# Patient Record
Sex: FEMALE
[De-identification: names, ages, dates, MRNs, and addresses within clinical notes are randomized per-mention and may not be internally consistent; named-entity substitution may affect disease eponyms.]

---

## 2024-06-11 ENCOUNTER — DASHBOARD ENCOUNTERS (OUTPATIENT)
Age: 59
End: 2024-06-11

## 2024-06-11 ENCOUNTER — OFFICE VISIT (OUTPATIENT)
Dept: URBAN - METROPOLITAN AREA CLINIC 48 | Facility: CLINIC | Age: 59
End: 2024-06-11
Payer: COMMERCIAL

## 2024-06-11 ENCOUNTER — LAB OUTSIDE AN ENCOUNTER (OUTPATIENT)
Dept: URBAN - METROPOLITAN AREA CLINIC 48 | Facility: CLINIC | Age: 59
End: 2024-06-11

## 2024-06-11 VITALS
HEIGHT: 64 IN | DIASTOLIC BLOOD PRESSURE: 83 MMHG | TEMPERATURE: 97.7 F | BODY MASS INDEX: 22.74 KG/M2 | SYSTOLIC BLOOD PRESSURE: 133 MMHG | HEART RATE: 58 BPM | WEIGHT: 133.2 LBS

## 2024-06-11 DIAGNOSIS — D50.8 ACHLORHYDRIC ANEMIA: ICD-10-CM

## 2024-06-11 DIAGNOSIS — Z12.11 SCREENING FOR COLON CANCER: ICD-10-CM

## 2024-06-11 PROBLEM — 87522002: Status: ACTIVE | Noted: 2024-06-11

## 2024-06-11 PROCEDURE — 99204 OFFICE O/P NEW MOD 45 MIN: CPT | Performed by: INTERNAL MEDICINE

## 2024-06-11 RX ORDER — SERTRALINE HYDROCHLORIDE 100 MG/1
1 TABLET TABLET, FILM COATED ORAL ONCE A DAY
Refills: 0 | Status: ACTIVE | COMMUNITY

## 2024-06-11 NOTE — HPI-TODAY'S VISIT:
59-year-old female with history of lymphoma (status post lymph node radiation in 2015) presents for evaluation of anemia.  Patient follows with Dr. Santana at [facility] in [place].  At oncology OV 5/6/2024, patient's hemoglobin was 11.7 with normal MCV, and iron studies were ordered. Creatinine was normal. She states she was previously on PO iron. Reports ongoing gas/bloating, which she has had for years. She has never had iron infusion or blood transfusion. Patient denies dysphagia, early satiety, N/V, abd pain, melena/hematochezia, rectal bleeding, weight loss. She has had constipation for the past year, and reports difficulty having regular BMs unless she eats greens and fiber. If she eats a high-fiber diet, she has BM daily.  Per oncology note, patient's last colonoscopy in 2015 at [facility] following diagnosis of lymphoma. Denies family hx CRC or polyps. PET CT abdomen pelvis 4/30/2024 showed area of suspected soft tissue in the mesentery, 1.2 cm x 2.2 cm, redundant sigmoid colon into the left upper quadrant, additional shotty mesenteric lymph nodes.   No heart, lung, or kidney problems. No pacemaker/defibrillator, home O2, dialysis. No blood thinner use. She is not diabetic.

## 2024-06-26 ENCOUNTER — OFFICE VISIT (OUTPATIENT)
Dept: URBAN - METROPOLITAN AREA SURGERY CENTER 28 | Facility: SURGERY CENTER | Age: 59
End: 2024-06-26

## 2024-07-19 ENCOUNTER — TELEPHONE ENCOUNTER (OUTPATIENT)
Dept: URBAN - METROPOLITAN AREA CLINIC 44 | Facility: CLINIC | Age: 59
End: 2024-07-19

## 2024-07-22 ENCOUNTER — OFFICE VISIT (OUTPATIENT)
Dept: URBAN - METROPOLITAN AREA SURGERY CENTER 28 | Facility: SURGERY CENTER | Age: 59
End: 2024-07-22

## 2024-09-17 ENCOUNTER — CLAIMS CREATED FROM THE CLAIM WINDOW (OUTPATIENT)
Dept: URBAN - METROPOLITAN AREA CLINIC 4 | Facility: CLINIC | Age: 59
End: 2024-09-17
Payer: COMMERCIAL

## 2024-09-17 ENCOUNTER — CLAIMS CREATED FROM THE CLAIM WINDOW (OUTPATIENT)
Dept: URBAN - METROPOLITAN AREA SURGERY CENTER 28 | Facility: SURGERY CENTER | Age: 59
End: 2024-09-17
Payer: COMMERCIAL

## 2024-09-17 DIAGNOSIS — Z12.11 COLON CANCER SCREENING: ICD-10-CM

## 2024-09-17 DIAGNOSIS — K63.5 BENIGN COLON POLYPS: ICD-10-CM

## 2024-09-17 DIAGNOSIS — D50.0 1. ANEMIA, IRON DEFICIENCY FROM CHRONIC BLOOD LOSS:: ICD-10-CM

## 2024-09-17 DIAGNOSIS — K31.89 ACHYLIA: ICD-10-CM

## 2024-09-17 DIAGNOSIS — K31.89 OTHER DISEASES OF STOMACH AND DUODENUM: ICD-10-CM

## 2024-09-17 DIAGNOSIS — D12.2 ADENOMA OF ASCENDING COLON: ICD-10-CM

## 2024-09-17 DIAGNOSIS — D12.2 BENIGN NEOPLASM OF ASCENDING COLON: ICD-10-CM

## 2024-09-17 DIAGNOSIS — D12.2 ADENOMATOUS POLYP OF ASCENDING COLON: ICD-10-CM

## 2024-09-17 DIAGNOSIS — K63.5 POLYP OF COLON: ICD-10-CM

## 2024-09-17 PROCEDURE — 45385 COLONOSCOPY W/LESION REMOVAL: CPT | Performed by: INTERNAL MEDICINE

## 2024-09-17 PROCEDURE — 88305 TISSUE EXAM BY PATHOLOGIST: CPT | Performed by: PATHOLOGY

## 2024-09-17 PROCEDURE — 00813 ANES UPR LWR GI NDSC PX: CPT | Performed by: NURSE ANESTHETIST, CERTIFIED REGISTERED

## 2024-09-17 PROCEDURE — 43239 EGD BIOPSY SINGLE/MULTIPLE: CPT | Performed by: INTERNAL MEDICINE

## 2024-09-17 RX ORDER — SERTRALINE HYDROCHLORIDE 100 MG/1
1 TABLET TABLET, FILM COATED ORAL ONCE A DAY
Refills: 0 | Status: ACTIVE | COMMUNITY